# Patient Record
Sex: FEMALE | Race: WHITE | URBAN - METROPOLITAN AREA
[De-identification: names, ages, dates, MRNs, and addresses within clinical notes are randomized per-mention and may not be internally consistent; named-entity substitution may affect disease eponyms.]

---

## 2021-02-23 ENCOUNTER — NEW PATIENT (OUTPATIENT)
Dept: URBAN - METROPOLITAN AREA CLINIC 44 | Facility: CLINIC | Age: 76
End: 2021-02-23
Payer: MEDICARE

## 2021-02-23 DIAGNOSIS — H25.813 COMBINED FORMS OF AGE-RELATED CATARACT, BILATERAL: Primary | ICD-10-CM

## 2021-02-23 PROCEDURE — 99204 OFFICE O/P NEW MOD 45 MIN: CPT | Performed by: OPTOMETRIST

## 2021-02-23 PROCEDURE — 92133 CPTRZD OPH DX IMG PST SGM ON: CPT | Performed by: OPTOMETRIST

## 2021-02-23 ASSESSMENT — VISUAL ACUITY
OS: 20/20
OD: 20/20

## 2021-02-23 ASSESSMENT — KERATOMETRY
OS: 43.88
OD: 44.13

## 2021-02-23 ASSESSMENT — INTRAOCULAR PRESSURE
OS: 17
OD: 17

## 2021-03-19 ENCOUNTER — Encounter (OUTPATIENT)
Dept: URBAN - METROPOLITAN AREA CLINIC 44 | Facility: CLINIC | Age: 76
End: 2021-03-19
Payer: MEDICARE

## 2021-03-19 PROCEDURE — 99203 OFFICE O/P NEW LOW 30 MIN: CPT | Performed by: PHYSICIAN ASSISTANT

## 2021-03-19 PROCEDURE — 92025 CPTRIZED CORNEAL TOPOGRAPHY: CPT | Performed by: OPHTHALMOLOGY

## 2021-03-25 ENCOUNTER — NEW PATIENT (OUTPATIENT)
Dept: URBAN - METROPOLITAN AREA CLINIC 44 | Facility: CLINIC | Age: 76
End: 2021-03-25
Payer: MEDICARE

## 2021-03-25 DIAGNOSIS — H53.40 UNSPECIFIED VISUAL FIELD DEFECTS: ICD-10-CM

## 2021-03-25 DIAGNOSIS — H40.1134 PRIMARY OPEN-ANGLE GLAUCOMA, INDETERMINATE, BILATERAL: ICD-10-CM

## 2021-03-25 DIAGNOSIS — H43.393 OTHER VITREOUS OPACITIES, BILATERAL: ICD-10-CM

## 2021-03-25 DIAGNOSIS — H52.221 REGULAR ASTIGMATISM, RIGHT EYE: ICD-10-CM

## 2021-03-25 PROCEDURE — 99204 OFFICE O/P NEW MOD 45 MIN: CPT | Performed by: OPHTHALMOLOGY

## 2021-03-25 PROCEDURE — 76514 ECHO EXAM OF EYE THICKNESS: CPT | Performed by: OPHTHALMOLOGY

## 2021-03-25 PROCEDURE — 92020 GONIOSCOPY: CPT | Performed by: OPHTHALMOLOGY

## 2021-03-25 PROCEDURE — 92083 EXTENDED VISUAL FIELD XM: CPT | Performed by: OPHTHALMOLOGY

## 2021-03-25 RX ORDER — LATANOPROST 50 UG/ML
0.005 % SOLUTION OPHTHALMIC
Qty: 5 | Refills: 1 | Status: INACTIVE
Start: 2021-03-25 | End: 2021-07-20

## 2021-03-25 ASSESSMENT — INTRAOCULAR PRESSURE
OD: 17
OS: 17

## 2021-04-01 ENCOUNTER — SURGERY (OUTPATIENT)
Dept: URBAN - METROPOLITAN AREA SURGERY 19 | Facility: SURGERY | Age: 76
End: 2021-04-01
Payer: MEDICARE

## 2021-04-02 ENCOUNTER — POST-OPERATIVE VISIT (OUTPATIENT)
Dept: URBAN - METROPOLITAN AREA CLINIC 44 | Facility: CLINIC | Age: 76
End: 2021-04-02

## 2021-04-02 PROCEDURE — 99024 POSTOP FOLLOW-UP VISIT: CPT | Performed by: OPTOMETRIST

## 2021-04-02 ASSESSMENT — INTRAOCULAR PRESSURE: OD: 10

## 2021-04-02 NOTE — IMPRESSION/PLAN
Impression: S/P Cataract Extraction by phacoemulsification with IOL placement; Goniotomy; Astigmatic Keratotomy OD - 1 Day. Encounter for surgical aftercare following surgery on a sense organ  Z48.810. Excellent post op course Plan: Reviewed findings with patient. Continue with prescribed medications as directed. RTC 4 week for Refraction + IOP check.  . RTC sooner if any decreased vision or pain occurs

## 2021-05-06 ENCOUNTER — POST-OPERATIVE VISIT (OUTPATIENT)
Dept: URBAN - METROPOLITAN AREA CLINIC 44 | Facility: CLINIC | Age: 76
End: 2021-05-06

## 2021-05-06 PROCEDURE — 99024 POSTOP FOLLOW-UP VISIT: CPT | Performed by: OPTOMETRIST

## 2021-05-06 ASSESSMENT — INTRAOCULAR PRESSURE
OD: 16
OS: 14

## 2021-05-06 ASSESSMENT — VISUAL ACUITY
OD: 20/25
OS: 20/25

## 2021-05-06 NOTE — IMPRESSION/PLAN
Impression: S/P Cataract Extraction by phacoemulsification with IOL placement; Goniotomy; Astigmatic Keratotomy OD - 35 Days. Encounter for surgical aftercare following surgery on a sense organ  Z48.810. POAG with good IOP Plan: RTC 6 months for complete/OCT(Nerve). Deferred Rx for glasses today.  Happy with OTC readers

## 2021-09-20 ENCOUNTER — OFFICE VISIT (OUTPATIENT)
Dept: URBAN - METROPOLITAN AREA CLINIC 44 | Facility: CLINIC | Age: 76
End: 2021-09-20
Payer: MEDICARE

## 2021-09-20 DIAGNOSIS — H40.023 OPEN ANGLE WITH BORDERLINE FINDINGS, HIGH RISK, BILATERAL: ICD-10-CM

## 2021-09-20 DIAGNOSIS — H26.499 OTHER SECONDARY CATARACT OF EYE: ICD-10-CM

## 2021-09-20 DIAGNOSIS — H43.813 VITREOUS DEGENERATION, BILATERAL: ICD-10-CM

## 2021-09-20 DIAGNOSIS — H25.12 AGE-RELATED NUCLEAR CATARACT, LEFT EYE: Primary | ICD-10-CM

## 2021-09-20 PROCEDURE — 92134 CPTRZ OPH DX IMG PST SGM RTA: CPT | Performed by: OPTOMETRIST

## 2021-09-20 PROCEDURE — 99214 OFFICE O/P EST MOD 30 MIN: CPT | Performed by: OPTOMETRIST

## 2021-09-20 PROCEDURE — 92133 CPTRZD OPH DX IMG PST SGM ON: CPT | Performed by: OPTOMETRIST

## 2021-09-20 ASSESSMENT — INTRAOCULAR PRESSURE
OS: 10
OD: 12

## 2021-09-20 ASSESSMENT — KERATOMETRY
OD: 44.38
OS: 44.13

## 2021-09-20 ASSESSMENT — VISUAL ACUITY
OS: 20/25
OD: 20/20

## 2021-09-20 NOTE — IMPRESSION/PLAN
Impression: Other secondary cataract of eye: H26.499. Visually non-significant PCO OD. Patient asymptomatic and happy with current level of vision. Plan: PLAN: RTC 12 months for complete exam complete + possible OCT (Mac). RTC sooner if patient symptoms become worse.

## 2021-09-20 NOTE — IMPRESSION/PLAN
Impression: Tear film insufficiency of bilateral lacrimal glands: H04.123. Clinical evaluation shows mild DED signs. Patient reports no or occasional symptoms not interfering with daily activities. Plan: PLAN: Warm compresses for 10 minutes AM (Henry Mask or equal). Recommend Lipid based tears to be used 4X daily. RTC if symptom's worsen.

## 2021-09-20 NOTE — IMPRESSION/PLAN
Impression: Age-related nuclear cataract, left eye: H25.12. Visually significant/symptomatic cataracts. BCVA 20/25OS. Glare 20/50OS. Plan: Discussed cataract findings and treatment options with patient. Rec CE/IOL to improve vision. Patient wishes to proceed with surgery. R/A/B to be discussed. OS only. Patient is a candidate for toric, and standard IOL. Goal is plano and patient understand they will need glasses for near vision.

## 2021-09-20 NOTE — IMPRESSION/PLAN
Impression: Open angle with borderline findings, high risk, bilateral
Suspicious disc (Vertical cupping OD .82, OS .83) with moderate superior RNFL loss and mild inferior loss OU (Average OD, OS 72). -Fm Hx. IOP normal OU today. Plan: PLAN: Observe.  RTC as sched

## 2021-10-12 ENCOUNTER — ADULT PHYSICAL (OUTPATIENT)
Dept: URBAN - METROPOLITAN AREA CLINIC 44 | Facility: CLINIC | Age: 76
End: 2021-10-12
Payer: MEDICARE

## 2021-10-12 DIAGNOSIS — Z01.818 ENCOUNTER FOR OTHER PREPROCEDURAL EXAMINATION: Primary | ICD-10-CM

## 2021-10-12 PROCEDURE — 99213 OFFICE O/P EST LOW 20 MIN: CPT | Performed by: PHYSICIAN ASSISTANT

## 2021-10-22 ENCOUNTER — PRE-OPERATIVE VISIT (OUTPATIENT)
Dept: URBAN - METROPOLITAN AREA CLINIC 44 | Facility: CLINIC | Age: 76
End: 2021-10-22
Payer: MEDICARE

## 2021-10-22 DIAGNOSIS — H25.812 COMBINED FORMS OF AGE-RELATED CATARACT, LEFT EYE: Primary | ICD-10-CM

## 2021-10-22 DIAGNOSIS — H04.123 DRY EYE SYNDROME OF BILATERAL LACRIMAL GLANDS: ICD-10-CM

## 2021-10-22 PROCEDURE — 99214 OFFICE O/P EST MOD 30 MIN: CPT | Performed by: OPHTHALMOLOGY

## 2021-10-22 PROCEDURE — 92020 GONIOSCOPY: CPT | Performed by: OPHTHALMOLOGY

## 2021-10-22 PROCEDURE — 92083 EXTENDED VISUAL FIELD XM: CPT | Performed by: OPHTHALMOLOGY

## 2021-10-22 ASSESSMENT — INTRAOCULAR PRESSURE
OD: 14
OD: 11
OS: 13
OS: 15

## 2021-10-22 NOTE — IMPRESSION/PLAN
Impression: Dry eye syndrome of bilateral lacrimal glands: H04.123. Plan: AT's 3-4 times daily both eyes recommended. Discussed with patient, understands this may limit vision after surgery.

## 2021-10-22 NOTE — IMPRESSION/PLAN
Impression: Visual field defects Plan: DEAR PCP: Would still recommend baseline , if not already done, neuro head/orbital imaging (mri/mra if no contraindications)  to r/o any potential intracranial pathology/ compressive/vascular lesions , pt has possible optic nerve pallor ou  and visual field loss od>os - thank you

## 2021-10-22 NOTE — IMPRESSION/PLAN
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 Plan: (( AIM  -0.25  OS: INJECTABLE OS (DEXYCU 1st choice), Epi, possible stretch, NO ORA OS; NO LRI OS; MIGS OS  (KDB 1st, Istent at backup) do not tape head, Glaucoma coverage; DENSE cataract )) Discussed cataract diagnosis with the patient. Appropriate testing ordered for cataract diagnosis prior to Preop. Risks and benefits of surgical treatment were discussed and understood. Patient desires surgical treatment. Discussed lens options with pt and pt is ok with wearing glasses after surgery. Patient desires surgery to proceed with surgery OS, OS ONLY - 2nd EYE. Both eyes examined, medically necessary due to impact in activities of daily living. Discussed with pt limitations of MIGS device and it does not replace  Glaucoma eye drops and would have to continue treatment and would still need glasses after surgery. Discussed higher risks with smaller pupil and discussed iris stretch and higher risks of bleeding. Discussed there is a chance of developing capsular haze after surgery, which may be corrected with laser/yag after sx. Understands higher risk of complication and delayed healing due to DENSE cataract.

## 2021-10-22 NOTE — IMPRESSION/PLAN
Impression: Primary open-angle glaucoma, indeterminate, bilateral
DENIES Family Hx of Glaucoma,  Heart or Lung Problems, Hx of Migraines POSITIVE Sulfa Allergy and sleep apnea (uses mouthpiece/retainer) Plan: PLAN: On Latanoprost QHS OU   , test reviewed, IOP is     and so may proceed with cataract surgery with MIGS  (KDB 1st, Istent at backup) and Discussed glaucoma may limit vision after surgery, may proceed with migs   in hopes of better iop control - understands does not eliminate meds. Discussed possible unmasking of scotoma after surgery. still rec baseline neuroimaging TESTS: Reviewed 10-22-21  Visual Field - OD: Poor-sup and inf depression; OS: Fair-nasal depression 03/25/21 Pachs OD) Thin and increased risk. Pachs OS) Thin and increased risk Discussed Glaucoma diagnosis in detail with patient. Emphasized and explain complaince. poor compliance can lead to Blindness.

## 2021-10-29 ENCOUNTER — SURGERY (OUTPATIENT)
Dept: URBAN - METROPOLITAN AREA SURGERY 19 | Facility: SURGERY | Age: 76
End: 2021-10-29
Payer: MEDICARE

## 2021-10-30 ENCOUNTER — POST-OPERATIVE VISIT (OUTPATIENT)
Dept: URBAN - METROPOLITAN AREA CLINIC 44 | Facility: CLINIC | Age: 76
End: 2021-10-30

## 2021-10-30 DIAGNOSIS — Z48.810 ENCOUNTER FOR SURGICAL AFTERCARE FOLLOWING SURGERY ON A SENSE ORGAN: Primary | ICD-10-CM

## 2021-10-30 PROCEDURE — 99024 POSTOP FOLLOW-UP VISIT: CPT | Performed by: OPTOMETRIST

## 2021-10-30 ASSESSMENT — INTRAOCULAR PRESSURE: OS: 9

## 2021-10-30 NOTE — IMPRESSION/PLAN
Impression: S/P Cataract Extraction by phacoemulsification with IOL placement/KDB OS - 1 Day. Encounter for surgical aftercare following surgery on a sense organ  Z48.450.  Plan:

## 2021-11-29 ENCOUNTER — POST-OPERATIVE VISIT (OUTPATIENT)
Dept: URBAN - METROPOLITAN AREA CLINIC 44 | Facility: CLINIC | Age: 76
End: 2021-11-29

## 2021-11-29 DIAGNOSIS — Z96.1 PRESENCE OF INTRAOCULAR LENS: Primary | ICD-10-CM

## 2021-11-29 DIAGNOSIS — H52.4 PRESBYOPIA: ICD-10-CM

## 2021-11-29 PROCEDURE — 99024 POSTOP FOLLOW-UP VISIT: CPT | Performed by: OPTOMETRIST

## 2021-11-29 ASSESSMENT — INTRAOCULAR PRESSURE
OD: 13
OS: 12

## 2021-11-29 ASSESSMENT — VISUAL ACUITY
OD: 20/20
OS: 20/20

## 2021-11-29 NOTE — IMPRESSION/PLAN
Impression: S/P Cataract Extraction by phacoemulsification with IOL placement/KDB OS - 31 Days. Presence of intraocular lens  Z96.1. DED OU Plan: Rx given to patient. RTC 6 months for complete + RNFL.

## 2022-05-31 ENCOUNTER — OFFICE VISIT (OUTPATIENT)
Dept: URBAN - METROPOLITAN AREA CLINIC 44 | Facility: CLINIC | Age: 77
End: 2022-05-31
Payer: MEDICARE

## 2022-05-31 DIAGNOSIS — H40.1134 PRIMARY OPEN-ANGLE GLAUCOMA, INDETERMINATE, BILATERAL: Primary | ICD-10-CM

## 2022-05-31 DIAGNOSIS — H26.499 OTHER SECONDARY CATARACT OF EYE: ICD-10-CM

## 2022-05-31 DIAGNOSIS — H43.811 VITREOUS DEGENERATION, RIGHT EYE: ICD-10-CM

## 2022-05-31 DIAGNOSIS — H04.123 TEAR FILM INSUFFICIENCY OF BILATERAL LACRIMAL GLANDS: ICD-10-CM

## 2022-05-31 PROCEDURE — 92133 CPTRZD OPH DX IMG PST SGM ON: CPT | Performed by: OPTOMETRIST

## 2022-05-31 PROCEDURE — 92134 CPTRZ OPH DX IMG PST SGM RTA: CPT | Performed by: OPTOMETRIST

## 2022-05-31 PROCEDURE — 92014 COMPRE OPH EXAM EST PT 1/>: CPT | Performed by: OPTOMETRIST

## 2022-05-31 ASSESSMENT — KERATOMETRY
OS: 43.75
OD: 44.13

## 2022-05-31 ASSESSMENT — VISUAL ACUITY
OS: 20/20
OD: 20/20

## 2022-05-31 ASSESSMENT — INTRAOCULAR PRESSURE
OS: 12
OD: 14

## 2022-05-31 NOTE — IMPRESSION/PLAN
Impression: Vitreous degeneration, right eye: H43.811. Reports no new floaters, flashes  or veiling. No retinal defects noted. Patient reports occasional floaters which are not interfering with daily activities and vision. Plan: PLAN: Discussed S/S of RD/RT. Stressed importance of RTC immediately if S/S progress.

## 2022-05-31 NOTE — IMPRESSION/PLAN
Impression: Primary open-angle glaucoma, indeterminate, bilateral: H40.1134. =IOP 14, 12. RNFL stable OU, but possible OD field changes
-Vertical cupping OD . 83, OS .79 OD w RNFL loss (S,i) (Average 75 OD, 79 OS. Average OD 75 vs 72 3/21. OS stable 76
-VF#2 OD with superior, inferior arcuate defect. Nasal step defect. Field not stable. (VFI 72% 10/21 vs 78% 3/21) OS with superior and nasal step defects.  Stable (VFI 96% 10/21 vs 95% 3/21)  -Fm Hx.
-KBD OU in 2021 Plan: PLAN: Cont Latanoprost and RTC 4 months for 24-2 VF + IOP check and refraction

## 2022-05-31 NOTE — IMPRESSION/PLAN
Impression: Other secondary cataract of eye: H26.499. Visually non-significant PCO OU. Patient asymptomatic and happy with current level of vision. Plan: PLAN: RTC 12 months for complete exam complete + OCT (Mac). RTC sooner if patient symptoms become worse.

## 2022-10-04 ENCOUNTER — OFFICE VISIT (OUTPATIENT)
Dept: URBAN - METROPOLITAN AREA CLINIC 44 | Facility: CLINIC | Age: 77
End: 2022-10-04
Payer: MEDICARE

## 2022-10-04 DIAGNOSIS — H40.1134 PRIMARY OPEN-ANGLE GLAUCOMA, INDETERMINATE, BILATERAL: Primary | ICD-10-CM

## 2022-10-04 PROCEDURE — 99214 OFFICE O/P EST MOD 30 MIN: CPT | Performed by: OPTOMETRIST

## 2022-10-04 PROCEDURE — 92083 EXTENDED VISUAL FIELD XM: CPT | Performed by: OPTOMETRIST

## 2022-10-04 ASSESSMENT — VISUAL ACUITY
OD: 20/20
OS: 20/20

## 2022-10-04 ASSESSMENT — INTRAOCULAR PRESSURE
OD: 12
OS: 13

## 2022-10-04 NOTE — IMPRESSION/PLAN
Impression: Primary open-angle glaucoma, indeterminate, bilateral: H40.1134. =IOP 12, 13. VF loss noted OD. -Vertical cupping OD . 83, OS .79 OD w RNFL loss (S,i). OS with no RNFL loss. Average 75 OD, 79 OS. -VF#3 OD with superior, inferior accurate defect. Nasal step defect. Field not stable. (VFI 55% 10/22 vs 72% 10/21 vs 78% 3/21) OS with superior and nasal step defects. (VFI 90% 10/22 vs 96% 10/21 vs 95% 3/21)  -Fm Hx.
-KBD OU in 2021 Plan: PLAN: Discussed findings with patient. Reset IOP targets to OD <12. OS <16. Cont Latanoprost and RTC for Glaucoma consult due to progressive VF loss OD. Consider SLT vs additional IOP medications.

## 2022-12-09 ENCOUNTER — OFFICE VISIT (OUTPATIENT)
Dept: URBAN - METROPOLITAN AREA CLINIC 44 | Facility: CLINIC | Age: 77
End: 2022-12-09
Payer: MEDICARE

## 2022-12-09 DIAGNOSIS — H40.1134 PRIMARY OPEN-ANGLE GLAUCOMA, INDETERMINATE, BILATERAL: Primary | ICD-10-CM

## 2022-12-09 DIAGNOSIS — H26.499 OTHER SECONDARY CATARACT OF EYE: ICD-10-CM

## 2022-12-09 PROCEDURE — 76514 ECHO EXAM OF EYE THICKNESS: CPT | Performed by: OPHTHALMOLOGY

## 2022-12-09 PROCEDURE — 92020 GONIOSCOPY: CPT | Performed by: OPHTHALMOLOGY

## 2022-12-09 PROCEDURE — 99214 OFFICE O/P EST MOD 30 MIN: CPT | Performed by: OPHTHALMOLOGY

## 2022-12-09 ASSESSMENT — INTRAOCULAR PRESSURE
OS: 16
OD: 17

## 2022-12-09 NOTE — IMPRESSION/PLAN
Impression: Primary open-angle glaucoma, indeterminate, bilateral: H40.1134. CEIOL with Tellyk OU ([place]) Plan: Pt has Glaucoma    Gonio :        Pachs:  500/508    Today's IOP :  17/16       Tmax  :  17/17 Target IOP mid teens Pt denies Fhx of Glaucoma Right eye is the better seeing eye HVF: dense nasal scotoma encroaching temp OD, scattered central loss OS (10/4/22) C/D:  0.85x0.85//0.8x0.8 OCT: 75/76 (5/31/22) Pt denies Sulfa Allergy   // Pt denies Lung /Heart dx Plan :
1. Continue Latanoprost QHS OU Discussed details about Glaucoma and that without proper control of pressures irreversible blindness can occur. Patient understands risks. Emphasize compliance with drop and without compliance vision loss progression can occur. Will have pt return in 2-3 months for repeat HVF to confirm recent test results - advancement on HVF but not OCT - poor reliability on testing. IF testing is truly progressed or IOP still high teens then plan SLT or meds 2. IOP on the high end today. Not currently recommending SLT.

## 2022-12-09 NOTE — IMPRESSION/PLAN
Impression: Other secondary cataract of eye: H26.499. Plan: Discussed diagnosis of Opacified capsule with patient. Recommend Laser YAG Capsulotomy to improve vision. Risks, benefits, alternatives to surgery discussed with patient including pain, increase in intraocular pressure, bleeding, inflammation, loss of vision, increased floaters, and need for more surgery. Patient understands the risks and wishes to proceed with  YAG laser capsulotomy. All questions answered. 

See PCCs to schedule Yag OD and OS

## 2023-01-23 ENCOUNTER — SURGERY (OUTPATIENT)
Dept: URBAN - METROPOLITAN AREA SURGERY 19 | Facility: SURGERY | Age: 78
End: 2023-01-23
Payer: MEDICARE

## 2023-01-23 DIAGNOSIS — H26.492 OTHER SECONDARY CATARACT, LEFT EYE: Primary | ICD-10-CM

## 2023-01-23 PROCEDURE — 66821 AFTER CATARACT LASER SURGERY: CPT | Performed by: OPHTHALMOLOGY

## 2023-04-28 ENCOUNTER — OFFICE VISIT (OUTPATIENT)
Dept: URBAN - METROPOLITAN AREA CLINIC 45 | Facility: CLINIC | Age: 78
End: 2023-04-28
Payer: MEDICARE

## 2023-04-28 DIAGNOSIS — H40.1134 PRIMARY OPEN-ANGLE GLAUCOMA, BILATERAL, INDETERMINATE STAGE: Primary | ICD-10-CM

## 2023-04-28 DIAGNOSIS — H43.811 VITREOUS DEGENERATION, RIGHT EYE: ICD-10-CM

## 2023-04-28 DIAGNOSIS — H04.123 DRY EYE SYNDROME OF BILATERAL LACRIMAL GLANDS: ICD-10-CM

## 2023-04-28 PROCEDURE — 92004 COMPRE OPH EXAM NEW PT 1/>: CPT | Performed by: OPTOMETRIST

## 2023-04-28 PROCEDURE — 92133 CPTRZD OPH DX IMG PST SGM ON: CPT | Performed by: OPTOMETRIST

## 2023-04-28 RX ORDER — LATANOPROST 50 UG/ML
0.005 % SOLUTION OPHTHALMIC
Qty: 7.5 | Refills: 3 | Status: ACTIVE
Start: 2023-04-28

## 2023-04-28 ASSESSMENT — INTRAOCULAR PRESSURE
OS: 16
OD: 16

## 2023-04-28 NOTE — IMPRESSION/PLAN
Impression: Primary open-angle glaucoma, bilateral, indeterminate stage: H40.1134.  Plan: IOP is good ou, continue Latanoprost QHS OU.

iop goal low teens

## 2023-05-26 ENCOUNTER — OFFICE VISIT (OUTPATIENT)
Dept: URBAN - METROPOLITAN AREA CLINIC 52 | Facility: CLINIC | Age: 78
End: 2023-05-26

## 2023-05-26 DIAGNOSIS — H52.4 PRESBYOPIA: ICD-10-CM

## 2023-05-26 DIAGNOSIS — H04.123 DRY EYE SYNDROME OF BILATERAL LACRIMAL GLANDS: Primary | ICD-10-CM

## 2023-05-26 PROCEDURE — 99212 OFFICE O/P EST SF 10 MIN: CPT | Performed by: OPTOMETRIST

## 2023-05-26 ASSESSMENT — VISUAL ACUITY
OD: 20/20
OS: 20/20

## 2023-05-26 ASSESSMENT — INTRAOCULAR PRESSURE
OS: 15
OD: 15

## 2023-05-26 ASSESSMENT — KERATOMETRY
OS: 44.00
OD: 44.25

## 2023-05-26 NOTE — IMPRESSION/PLAN
Impression: Presbyopia: H52.4. Plan: Discussed need for reading glasses/add power, educated patient on bifocal, trifocal, and progressive options and expected adaptation period. Updated and released SRx today. Patient to use caution during daily activities until adaptation occurs. Continue to monitor annually. Discussed proper CL hygiene including not swimming, sleeping, or showering with lenses in and proper wear/replacement schedule. Patient to RTC 1wk after CL trials arrive for follow up. 
**Pt. needs an I and R training**

## 2023-06-16 ENCOUNTER — OFFICE VISIT (OUTPATIENT)
Dept: URBAN - METROPOLITAN AREA CLINIC 52 | Facility: CLINIC | Age: 78
End: 2023-06-16
Payer: MEDICARE

## 2023-06-16 DIAGNOSIS — H52.4 PRESBYOPIA: Primary | ICD-10-CM

## 2023-06-16 PROCEDURE — 92310 CONTACT LENS FITTING OU: CPT | Performed by: OPTOMETRIST

## 2023-06-16 NOTE — IMPRESSION/PLAN
Impression: Presbyopia: H52.4. Plan: Excellent comfort, fit, and vision with CLs. Patient expressed improvement in BCVA OD with -0.50 OR. Will trial new lens OD, patient to call if she'd like to finalize.

## 2023-06-23 ENCOUNTER — OFFICE VISIT (OUTPATIENT)
Dept: URBAN - METROPOLITAN AREA CLINIC 52 | Facility: CLINIC | Age: 78
End: 2023-06-23
Payer: MEDICARE

## 2023-06-23 DIAGNOSIS — H52.4 PRESBYOPIA: Primary | ICD-10-CM

## 2023-06-23 PROCEDURE — 92310 CONTACT LENS FITTING OU: CPT | Performed by: OPTOMETRIST

## 2023-06-23 NOTE — IMPRESSION/PLAN
Impression: Presbyopia: H52.4. Plan: Discussed proper CL hygiene including not swimming, sleeping, or showering with lenses in and proper wear/replacement schedule. Patient to call with final RX.

## 2023-07-07 ENCOUNTER — OFFICE VISIT (OUTPATIENT)
Dept: URBAN - METROPOLITAN AREA CLINIC 52 | Facility: CLINIC | Age: 78
End: 2023-07-07
Payer: MEDICARE

## 2023-07-07 DIAGNOSIS — H40.1134 PRIMARY OPEN-ANGLE GLAUCOMA, BILATERAL, INDETERMINATE STAGE: ICD-10-CM

## 2023-07-07 DIAGNOSIS — H52.4 PRESBYOPIA: Primary | ICD-10-CM

## 2023-07-07 PROCEDURE — 92310 CONTACT LENS FITTING OU: CPT | Performed by: OPTOMETRIST

## 2023-07-07 NOTE — IMPRESSION/PLAN
Impression: Primary open-angle glaucoma, bilateral, indeterminate stage: H40.1134. Plan: Patient would prefer to monitor glaucoma with Dr. Geovanny Arguelles.

## 2024-07-25 ENCOUNTER — OFFICE VISIT (OUTPATIENT)
Dept: URBAN - METROPOLITAN AREA CLINIC 45 | Facility: CLINIC | Age: 79
End: 2024-07-25
Payer: MEDICARE

## 2024-07-25 DIAGNOSIS — H11.32 SUBCONJUNCTIVAL HEMORRHAGE OF LEFT EYE: ICD-10-CM

## 2024-07-25 DIAGNOSIS — H04.123 TEAR FILM INSUFFICIENCY OF BILATERAL LACRIMAL GLANDS: ICD-10-CM

## 2024-07-25 DIAGNOSIS — H43.811 VITREOUS DEGENERATION, RIGHT EYE: ICD-10-CM

## 2024-07-25 DIAGNOSIS — H40.1134 PRIMARY OPEN-ANGLE GLAUCOMA, BILATERAL, INDETERMINATE STAGE: Primary | ICD-10-CM

## 2024-07-25 PROCEDURE — 92133 CPTRZD OPH DX IMG PST SGM ON: CPT

## 2024-07-25 PROCEDURE — 92014 COMPRE OPH EXAM EST PT 1/>: CPT

## 2024-07-25 RX ORDER — LATANOPROST 50 UG/ML
0.005 % SOLUTION OPHTHALMIC
Qty: 7.5 | Refills: 3 | Status: ACTIVE
Start: 2024-07-25

## 2024-07-25 ASSESSMENT — INTRAOCULAR PRESSURE
OS: 19
OD: 19

## 2024-07-25 ASSESSMENT — VISUAL ACUITY
OD: 20/20
OS: 20/20